# Patient Record
Sex: MALE | Employment: FULL TIME | URBAN - METROPOLITAN AREA
[De-identification: names, ages, dates, MRNs, and addresses within clinical notes are randomized per-mention and may not be internally consistent; named-entity substitution may affect disease eponyms.]

---

## 2017-04-25 ENCOUNTER — TELEPHONE (OUTPATIENT)
Dept: ORTHOPEDICS | Facility: CLINIC | Age: 32
End: 2017-04-25

## 2017-04-25 NOTE — TELEPHONE ENCOUNTER
Elsi paperwork received/completed and placed in outbox.    Igor Sarabia DO, CAMILTONM  [facility]

## 2019-01-09 ENCOUNTER — OFFICE VISIT (OUTPATIENT)
Dept: PEDIATRICS | Facility: CLINIC | Age: 34
End: 2019-01-09
Payer: COMMERCIAL

## 2019-01-09 VITALS
RESPIRATION RATE: 12 BRPM | SYSTOLIC BLOOD PRESSURE: 146 MMHG | HEART RATE: 61 BPM | HEIGHT: 65 IN | BODY MASS INDEX: 30.49 KG/M2 | WEIGHT: 183 LBS | DIASTOLIC BLOOD PRESSURE: 88 MMHG

## 2019-01-09 DIAGNOSIS — R20.2 HEMIPARESTHESIA: ICD-10-CM

## 2019-01-09 DIAGNOSIS — R03.0 ELEVATED BLOOD PRESSURE READING WITHOUT DIAGNOSIS OF HYPERTENSION: ICD-10-CM

## 2019-01-09 DIAGNOSIS — Z00.00 ROUTINE GENERAL MEDICAL EXAMINATION AT A HEALTH CARE FACILITY: Primary | ICD-10-CM

## 2019-01-09 PROCEDURE — 99213 OFFICE O/P EST LOW 20 MIN: CPT | Mod: 25 | Performed by: PEDIATRICS

## 2019-01-09 PROCEDURE — 99385 PREV VISIT NEW AGE 18-39: CPT | Performed by: PEDIATRICS

## 2019-01-09 ASSESSMENT — ENCOUNTER SYMPTOMS
ARTHRALGIAS: 1
HEMATURIA: 0
DYSURIA: 0
PALPITATIONS: 0
NAUSEA: 0
PARESTHESIAS: 0
WEAKNESS: 0
HEARTBURN: 0
ABDOMINAL PAIN: 0
CHILLS: 0
JOINT SWELLING: 0
COUGH: 0
SORE THROAT: 0
CONSTIPATION: 0
SHORTNESS OF BREATH: 0
NERVOUS/ANXIOUS: 0
FREQUENCY: 1
DIARRHEA: 0
EYE PAIN: 0
FEVER: 0
DIZZINESS: 0
MYALGIAS: 1

## 2019-01-09 ASSESSMENT — MIFFLIN-ST. JEOR: SCORE: 1696.96

## 2019-01-09 NOTE — PATIENT INSTRUCTIONS
Thank you for coming in to clinic today. It was a pleasure to meet you. I am currently building my patient panel and would be happy to see you back in clinic. I currently see patients on Wednesday afternoons and Thursday mornings.     Mayra Riley MD  Internal Medicine-Pediatrics   For appointments: [phone]      Doctor's Instructions:   I will call with further instructions / next steps in the next day after I do some more investigation.      Preventive Health Recommendations  Male Ages 26 - 39    Yearly exam:             See your health care provider every year in order to  o   Review health changes.   o   Discuss preventive care.    o   Review your medicines if your doctor has prescribed any.    You should be tested each year for STDs (sexually transmitted diseases), if you re at risk.     After age 35, talk to your provider about cholesterol testing. If you are at risk for heart disease, have your cholesterol tested at least every 5 years.     If you are at risk for diabetes, you should have a diabetes test (fasting glucose).  Shots: Get a flu shot each year. Get a tetanus shot every 10 years.     Nutrition:    Eat at least 5 servings of fruits and vegetables daily.     Eat whole-grain bread, whole-wheat pasta and brown rice instead of white grains and rice.     Get adequate Calcium and Vitamin D.     Lifestyle    Exercise for at least 150 minutes a week (30 minutes a day, 5 days a week). This will help you control your weight and prevent disease.     Limit alcohol to one drink per day.     No smoking.     Wear sunscreen to prevent skin cancer.     See your dentist every six months for an exam and cleaning.

## 2019-01-09 NOTE — PROGRESS NOTES
SUBJECTIVE:   CC: Elmo Hudson is an 33 year old male who presents for preventative health visit.     Discussed health maintenance - declines vaccinations at this time.  Has been noticing that the whole right side of body has been sore for about the last 2-3 months - unsure what is causing it. Feels like it is a bone pain not a joint or muscle ache.   Has been having random nose bleeds out of right nostril for approximately the last 2-3 months. Feels like they are becoming more frequent.    Elmo Hudson is an otherwise healthy 32 yo male presenting to establish care and discuss concerns about right-sided pain.    The patient reports that the whole right side of his body from his neck down has had intermittent throbbing pain as well as numbness and tingling.  He describes the pain as starting in his neck extending to his whole arm the right side of his chest wall and abdomen and groin and right leg.  His symptoms started insidiously and were not preceded by any trauma as far as he can remember.  The pain has been constant since onset but does wax and wane in intensity from 1 to close to 10 out of 10.  He describes the pain as throbbing as well as crushing.  It is throughout his extremities not just localized to the joints.  He denies any weight loss, fevers, chills.  He has occasional headaches which she describes as maybe once per month.  He denies any vision changes.  At times the pain will wake him from sleep.  He has been taking Aleve which does help his pain.  He denies any previous neck injuries, but did have a head injury when he was 2 or 3 years old.  He has no sequelae from that.  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  He has not had any limitations of his job, though this does exacerbate his symptoms at time.  The pain is worse with activity.  In addition to this he also reports nosebleeds occasionally.  These occur 1-2 times per week.  They seem more frequent than  previous wilson.  They do resolve on their own without intervention.  He denies any falls or dropping of objects, but does note he has had to reduce the amount he is able to bench press.  He is not having any difficulty with squatting at work.  He denies any history of numbness, tingling or weakness in the past with the exception of a known work injury to his wrist a couple of years ago.  Those symptoms have completely resolved.  No personal or family history of autoimmune disease including multiple sclerosis. He denies any symptoms in his face, difficulty with speaking. No falls or difficulty with walking, though he does experience pain in his right sole with walking.    He does reports urinary frequency and urgency related to increased oral intake. This has been ongoing for years and does not correlate with his current symptoms.       Physical   Annual:     Getting at least 3 servings of Calcium per day:  Yes    Bi-annual eye exam:  Yes    Dental care twice a year:  Yes    Sleep apnea or symptoms of sleep apnea:  None    Diet:  Regular (no restrictions)    Frequency of exercise:  2-3 days/week    Duration of exercise:  45-60 minutes    Taking medications regularly:  Yes    Medication side effects:  Not applicable    Additional concerns today:  No    PHQ-2 Total Score: 0        Today's PHQ-2 Score:   PHQ-2 ( 1999 Pfizer) 1/9/2019   Q1: Little interest or pleasure in doing things 0   Q2: Feeling down, depressed or hopeless 0   PHQ-2 Score 0   Q1: Little interest or pleasure in doing things Not at all   Q2: Feeling down, depressed or hopeless Not at all   PHQ-2 Score 0       Abuse: Current or Past(Physical, Sexual or Emotional)- No  Do you feel safe in your environment? Yes    Social History     Tobacco Use     Smoking status: Former Smoker     Last attempt to quit: 1/29/2008     Years since quitting: 10.9     Smokeless tobacco: Never Used   Substance Use Topics     Alcohol use: Not on file     Alcohol Use 1/9/2019    If you drink alcohol do you typically have greater than 3 drinks per day OR greater than 7 drinks per week? No   No flowsheet data found.    Last PSA: No results found for: PSA    Reviewed orders with patient. Reviewed health maintenance and updated orders accordingly - Yes  BP Readings from Last 3 Encounters:   01/09/19 146/88   06/01/16 128/82   04/20/16 122/80    Wt Readings from Last 3 Encounters:   01/09/19 83 kg (183 lb)   06/01/16 83.9 kg (185 lb)   04/20/16 83.9 kg (185 lb)            Patient Active Problem List   Diagnosis     Pain of right upper extremity     History reviewed. No pertinent surgical history.   No previous surgery   Social History     Tobacco Use     Smoking status: Former Smoker     Last attempt to quit: 1/29/2008     Years since quitting: 10.9     Smokeless tobacco: Never Used   Substance Use Topics     Alcohol use: Not on file      2 kids 11 yo and 7 yo  Lives with wife and kids.   Social drinking on the weekends. History reviewed. No pertinent family history.      No current outpatient medications on file.       Reviewed and updated as needed this visit by clinical staff  Tobacco  Allergies  Meds  Med Hx  Surg Hx  Fam Hx  Soc Hx          Review of Systems   Constitutional: Negative for chills and fever.   HENT: Negative for ear pain, hearing loss and sore throat.    Eyes: Negative for pain and visual disturbance.   Respiratory: Negative for cough and shortness of breath.    Cardiovascular: Negative for palpitations and peripheral edema.   Gastrointestinal: Negative for abdominal pain, constipation, diarrhea, heartburn and nausea.   Genitourinary: Positive for frequency and urgency. Negative for discharge, dysuria, genital sores, hematuria and impotence.   Musculoskeletal: Positive for arthralgias and myalgias. Negative for joint swelling.   Skin: Negative for rash.   Neurological: Negative for dizziness, weakness and paresthesias.   Psychiatric/Behavioral: Negative for mood  "changes. The patient is not nervous/anxious.          OBJECTIVE:   /88 (BP Location: Right arm, Patient Position: Sitting, Cuff Size: Adult Regular)   Pulse 61   Resp 12   Ht 1.651 m (5' 5\")   Wt 83 kg (183 lb)   BMI 30.45 kg/m      Physical Exam  Exam:  General: the patient is pleasant, resting comfortably, no acute distress.   HEENT: Normocephalic, atraumatic. TMs and canals are normal. No nasal discharge or deformity, no evidence of recent bleed. MMM, no oral lesions, age appropriate dentition. No scleral icterus or conjunctivitis, PERRL.   Neck: supple, full range of motion, no masses  Lymph: no cervical lymphadenopathy  Lungs: Clear to auscultation, no wheezes or crackles.   CV: RRR, nl s1 and s2, no m/r/g.   Abdomen: Soft, nondistended, nontender. No rebound or guarding. Bowel sounds active.  Extremities: No lower extremity edema. Peripheral pulses strong and symmetric.   Skin: no appreciable skin lesions/rashes on exposed skin.   Neuro: Alert and oriented x4, grossly normal neurologic exam. Symmetric muscle tone. CN II-XII intact. Strength 5/5 and symmetric. No appreviable weakness. Grossly intact sensation bilaterally. Finger-nose-finger and heel-shin testing normal. Rapidly alternating movements.     Diagnostic Test Results:  none     ASSESSMENT/PLAN:   1. Routine general medical examination at a health care facility  Otherwise healthy 34 yo male, BMI 30, blood pressure slightly elevated. Will return for labs as had to run off to work. Will check CBC, Lipids and BMP for his glucose.     - CBC with platelets; Future    - Lipid panel reflex to direct LDL Fasting; Future    - Basic metabolic panel  (Ca, Cl, CO2, Creat, Gluc, K, Na, BUN); Future    2. Hemiparesthesia  34 yo male with inisidious onset hemiparesthesias and pain involving upper and lower extremity, but not face. No appreciable weakness on exam, but ability to lift weights reduced on the right by report.  Etiology of his symptoms is " "unclear at this point.  I wonder about the possibility of upper cervical cord lesion such as demyelination.  He has no facial symptoms and large involvement of his body so a motor or muscular region seems unlikely.  The unilateral symptoms raise the potential of a Brownson-Sequard process but he has no trauma to support this. I discussed his case with neurologist on call at [facility] neurology who recommended he be seen and evaluated prior to imaging as he may benefit further from EMG first as this could be a myofascial pain syndrome. There are no red flag signs or symptoms to suggest urgency of workup. Urinary symptoms long proceeded his symptoms and symptoms have been stable over the last 2-3 months. Exam today unremarkable.    - NEUROLOGY ADULT REFERRAL    - Decision for imaging vs EMG per neurology    3. Elevated blood pressure reading without diagnosis of hypertension  Blood pressure elevated today.    - Recheck when he returns for labs in the next week      COUNSELING:   Reviewed preventive health counseling, as reflected in patient instructions       Regular exercise       Healthy diet/nutrition    BP Readings from Last 1 Encounters:   01/09/19 146/88     Estimated body mass index is 30.45 kg/m  as calculated from the following:    Height as of this encounter: 1.651 m (5' 5\").    Weight as of this encounter: 83 kg (183 lb).    BP Screening:   Last 3 BP Readings:    BP Readings from Last 3 Encounters:   01/09/19 146/88   06/01/16 128/82   04/20/16 122/80       The following was recommended to the patient:  Re-screen within 4 weeks and recommend lifestyle modifications  Weight management plan: will need to be readdressed at next visit     reports that he quit smoking about 10 years ago. he has never used smokeless tobacco.      Counseling Resources:  ATP IV Guidelines  Pooled Cohorts Equation Calculator  FRAX Risk Assessment  ICSI Preventive Guidelines  Dietary Guidelines for Americans, 2010  USDA's " MyPlate  ASA Prophylaxis  Lung CA Screening    Mayra Riley MD  [facility]

## 2019-02-14 DIAGNOSIS — D72.819: Primary | ICD-10-CM

## 2019-02-14 DIAGNOSIS — D72.819 LEUKOPENIA, UNSPECIFIED TYPE: Primary | ICD-10-CM

## 2019-02-14 DIAGNOSIS — Z00.00 ROUTINE GENERAL MEDICAL EXAMINATION AT A HEALTH CARE FACILITY: ICD-10-CM

## 2019-02-14 LAB
ERYTHROCYTE [DISTWIDTH] IN BLOOD BY AUTOMATED COUNT: 11.9 % (ref 10–15)
HCT VFR BLD AUTO: 41.8 % (ref 40–53)
HGB BLD-MCNC: 14.1 G/DL (ref 13.3–17.7)
MCH RBC QN AUTO: 28.3 PG (ref 26.5–33)
MCHC RBC AUTO-ENTMCNC: 33.7 G/DL (ref 31.5–36.5)
MCV RBC AUTO: 84 FL (ref 78–100)
PLATELET # BLD AUTO: 218 10E9/L (ref 150–450)
RBC # BLD AUTO: 4.98 10E12/L (ref 4.4–5.9)
WBC # BLD AUTO: 3.3 10E9/L (ref 4–11)

## 2019-02-14 PROCEDURE — 85027 COMPLETE CBC AUTOMATED: CPT | Performed by: PEDIATRICS

## 2019-02-14 PROCEDURE — 80048 BASIC METABOLIC PNL TOTAL CA: CPT | Performed by: PEDIATRICS

## 2019-02-14 PROCEDURE — 85025 COMPLETE CBC W/AUTO DIFF WBC: CPT | Performed by: PEDIATRICS

## 2019-02-14 PROCEDURE — 36415 COLL VENOUS BLD VENIPUNCTURE: CPT | Performed by: PEDIATRICS

## 2019-02-14 PROCEDURE — 80061 LIPID PANEL: CPT | Performed by: PEDIATRICS

## 2019-02-15 ENCOUNTER — TELEPHONE (OUTPATIENT)
Dept: PEDIATRICS | Facility: CLINIC | Age: 34
End: 2019-02-15

## 2019-02-15 LAB
ANION GAP SERPL CALCULATED.3IONS-SCNC: 4 MMOL/L (ref 3–14)
BASOPHILS # BLD AUTO: 0 10E9/L (ref 0–0.2)
BASOPHILS NFR BLD AUTO: 0.3 %
BUN SERPL-MCNC: 18 MG/DL (ref 7–30)
CALCIUM SERPL-MCNC: 9.1 MG/DL (ref 8.5–10.1)
CHLORIDE SERPL-SCNC: 102 MMOL/L (ref 94–109)
CHOLEST SERPL-MCNC: 179 MG/DL
CO2 SERPL-SCNC: 30 MMOL/L (ref 20–32)
CREAT SERPL-MCNC: 1.05 MG/DL (ref 0.66–1.25)
DIFFERENTIAL METHOD BLD: ABNORMAL
EOSINOPHIL # BLD AUTO: 0.1 10E9/L (ref 0–0.7)
EOSINOPHIL NFR BLD AUTO: 3.1 %
ERYTHROCYTE [DISTWIDTH] IN BLOOD BY AUTOMATED COUNT: 11.9 % (ref 10–15)
GFR SERPL CREATININE-BSD FRML MDRD: >90 ML/MIN/{1.73_M2}
GLUCOSE SERPL-MCNC: 84 MG/DL (ref 70–99)
HCT VFR BLD AUTO: 41.8 % (ref 40–53)
HDLC SERPL-MCNC: 76 MG/DL
HGB BLD-MCNC: 14.1 G/DL (ref 13.3–17.7)
LDLC SERPL CALC-MCNC: 96 MG/DL
LYMPHOCYTES # BLD AUTO: 1.7 10E9/L (ref 0.8–5.3)
LYMPHOCYTES NFR BLD AUTO: 50.8 %
MCH RBC QN AUTO: 28.3 PG (ref 26.5–33)
MCHC RBC AUTO-ENTMCNC: 33.7 G/DL (ref 31.5–36.5)
MCV RBC AUTO: 84 FL (ref 78–100)
MONOCYTES # BLD AUTO: 0.5 10E9/L (ref 0–1.3)
MONOCYTES NFR BLD AUTO: 14.4 %
NEUTROPHILS # BLD AUTO: 1 10E9/L (ref 1.6–8.3)
NEUTROPHILS NFR BLD AUTO: 31.4 %
NONHDLC SERPL-MCNC: 103 MG/DL
PLATELET # BLD AUTO: 218 10E9/L (ref 150–450)
POTASSIUM SERPL-SCNC: 3.8 MMOL/L (ref 3.4–5.3)
RBC # BLD AUTO: 4.98 10E12/L (ref 4.4–5.9)
SODIUM SERPL-SCNC: 136 MMOL/L (ref 133–144)
TRIGL SERPL-MCNC: 36 MG/DL
WBC # BLD AUTO: 3.3 10E9/L (ref 4–11)

## 2019-02-15 NOTE — TELEPHONE ENCOUNTER
Reason for Call:  Request for results:    Name of test or procedure: Lab work     Date of test of procedure: 2/14/19    Location of the test or procedure: [facility]     OK to leave the result message on voice mail or with a family member? YES    Phone number Patient can be reached at:  Home number on file [phone] (home)    Additional comments: The pt would like to know if there are lab results that he should be worried about.     The pt is aware that due to the time of day that this call was placed the pt may very well hear back from a nurse tomorrow morning. The pt agrees and understands with this plan.      Call taken on 2/15/2019 at 4:54 PM by Ellen Matta

## 2019-02-18 NOTE — TELEPHONE ENCOUNTER
Called pt this am & LM(see the lab result notes). Awaiting pt's call back. Will close this encounter for now.    Reji, RN  Triage Nurse

## 2019-02-28 DIAGNOSIS — D72.819: ICD-10-CM

## 2019-02-28 DIAGNOSIS — D70.9 NEUTROPENIA, UNSPECIFIED TYPE (H): Primary | ICD-10-CM

## 2019-02-28 LAB
BASOPHILS # BLD AUTO: 0 10E9/L (ref 0–0.2)
BASOPHILS NFR BLD AUTO: 0.3 %
DIFFERENTIAL METHOD BLD: ABNORMAL
EOSINOPHIL # BLD AUTO: 0.1 10E9/L (ref 0–0.7)
EOSINOPHIL NFR BLD AUTO: 3.5 %
ERYTHROCYTE [DISTWIDTH] IN BLOOD BY AUTOMATED COUNT: 12 % (ref 10–15)
HCT VFR BLD AUTO: 39.9 % (ref 40–53)
HGB BLD-MCNC: 13.5 G/DL (ref 13.3–17.7)
LYMPHOCYTES # BLD AUTO: 1.4 10E9/L (ref 0.8–5.3)
LYMPHOCYTES NFR BLD AUTO: 44.1 %
MCH RBC QN AUTO: 28.4 PG (ref 26.5–33)
MCHC RBC AUTO-ENTMCNC: 33.8 G/DL (ref 31.5–36.5)
MCV RBC AUTO: 84 FL (ref 78–100)
MONOCYTES # BLD AUTO: 0.4 10E9/L (ref 0–1.3)
MONOCYTES NFR BLD AUTO: 12.5 %
NEUTROPHILS # BLD AUTO: 1.2 10E9/L (ref 1.6–8.3)
NEUTROPHILS NFR BLD AUTO: 39.6 %
PLATELET # BLD AUTO: 201 10E9/L (ref 150–450)
RBC # BLD AUTO: 4.76 10E12/L (ref 4.4–5.9)
WBC # BLD AUTO: 3.1 10E9/L (ref 4–11)

## 2019-02-28 PROCEDURE — 85025 COMPLETE CBC W/AUTO DIFF WBC: CPT | Performed by: PEDIATRICS

## 2019-02-28 PROCEDURE — 36415 COLL VENOUS BLD VENIPUNCTURE: CPT | Performed by: PEDIATRICS

## 2019-04-04 ENCOUNTER — OFFICE VISIT (OUTPATIENT)
Dept: PEDIATRICS | Facility: CLINIC | Age: 34
End: 2019-04-04
Payer: COMMERCIAL

## 2019-04-04 VITALS
HEART RATE: 79 BPM | OXYGEN SATURATION: 100 % | DIASTOLIC BLOOD PRESSURE: 84 MMHG | TEMPERATURE: 97.7 F | SYSTOLIC BLOOD PRESSURE: 136 MMHG

## 2019-04-04 DIAGNOSIS — R03.0 ELEVATED BLOOD PRESSURE READING WITHOUT DIAGNOSIS OF HYPERTENSION: Primary | ICD-10-CM

## 2019-04-04 DIAGNOSIS — R20.2 PARESTHESIAS: ICD-10-CM

## 2019-04-04 DIAGNOSIS — D70.9 NEUTROPENIA, UNSPECIFIED TYPE (H): ICD-10-CM

## 2019-04-04 PROCEDURE — 99214 OFFICE O/P EST MOD 30 MIN: CPT | Performed by: PEDIATRICS

## 2019-04-04 PROCEDURE — 93000 ELECTROCARDIOGRAM COMPLETE: CPT | Performed by: PEDIATRICS

## 2019-04-04 NOTE — PROGRESS NOTES
SUBJECTIVE:                                                    Elmo Hudson is a 33 year old male who presents to clinic today for the following health issues:      Elmo Hudson is a 34 yo male who presents for follow up exam. I last saw him in clinic 2019 at which point he was complaining of right sided pain. With this I sent a referral to neurology for further workup. Also as part of his general medical exam I obtained a CBC which was significant for neutropenia with ANC of 1, repeat was 1.2.     1. Right sided pain / paresthesias. Continues, intermittently, but overall improved. He did see the neurologist and had an MRI which was unremarkable reportedly. He has a follow up appointment planned.     2. Neutropenia. This dates back to at least  based on previous lab evaluations.     3. Elevated blood pressures. The patient has been told by his dentists that his blood pressure is high. He has been told this now a couple of times. He has never been on meds in the past. No family history of hypertension or heart disease. The patient denies any chest pain, shortness of breath, swelling, exertional symptoms.   BP in : 146/88  BP at dental office: 140s/80s  BP today: 136/84    Problem list and histories reviewed & adjusted, as indicated.  Additional history: as documented    Patient Active Problem List   Diagnosis     Pain of right upper extremity     History reviewed. No pertinent surgical history.    Social History     Tobacco Use     Smoking status: Former Smoker     Last attempt to quit: 2008     Years since quittin.1     Smokeless tobacco: Never Used   Substance Use Topics     Alcohol use: Yes     Alcohol/week: 0.0 oz     History reviewed. No pertinent family history.      No current outpatient medications on file.     No Known Allergies    ROS:  A 10 point ROS is negative other than the symptoms noted above in the HPI.    OBJECTIVE:   /84 (Cuff Size: Adult Regular)   Pulse 79   Temp  97.7  F (36.5  C) (Tympanic)   SpO2 100%     Exam:  General: the patient is pleasant, resting comfortably, no acute distress.   HEENT: Normocephalic, atraumatic. MMM.   Lungs: Clear to auscultation, no wheezes or crackles.   CV: RRR, nl s1 and s2, no m/r/g.   Abdomen: Soft, nondistended, nontender. No rebound or guarding. Bowel sounds active.  Extremities: No lower extremity edema. Peripheral pulses strong and symmetric.   Skin: no appreciable skin lesions/rashes on exposed skin.   Neuro: Alert and oriented x4, grossly normal neurologic exam.     Diagnostic Test Results:  EKG: normal sinus rhythm, normal intervals, normal ST/T wave    ASSESSMENT/PLAN:     1. Elevated blood pressure reading without diagnosis of hypertension  Blood pressure in the pre-hypertensive range today.  Has had elevated pressures in the past.  No signs or symptoms at this point.  Most likely essential hypertension in this 33-year-old male with a BMI of 30.  We discussed options for treatment including lifestyle modifications versus medication.  The patient elected for lifestyle modification.  I agree with this plan as long as we have close follow-up which the patient agrees to.  We will have a weight check and blood pressure check in 3 months. Given occasional palpitations, did obtain EKG which was within normal limits.     - EKG 12-lead complete w/read - Clinics    - Lifestyle changes    2. Neutropenia, unspecified type (H)  ANC has ranged from 1-1.2 in the last 2 checks.  In reviewing his past medical history the patient has had low ANC dating back to 2016.  I think this is most likely neutropenia related to ethnicity.  No signs or symptoms of concerning for infectious etiology.  He has never seen hematology and I think this is an appropriate first step.  I anticipate he will only need one appointment and not need to continue to follow with them.  The patient is agreeable with this plan.    - Follow up with hematology    - Will have CBC,  peripheral smear drawn    3. Paresthesias  Right-sided symptoms which have improved without any intervention.  The patient did have MRI studies through the neurologist which are unavailable to me.  They are reportedly normal.  The plan is for follow-up with neurology.  He denies any falls or weakness.  No progression of symptoms makes sinister pathology this less likely.    - Follow up with neurology as previously planned    - Monitor for worrisome signs and symptoms.       Follow up: 3 months.       Mayra Riley MD  Internal Medicine Pediatrics  [facility]

## 2019-04-04 NOTE — PATIENT INSTRUCTIONS
Thank you for coming in to clinic today. It was a pleasure to meet you. I am currently building my patient panel and would be happy to see you back in clinic. I currently see patients on Wednesday afternoons and Thursday mornings.     Mayra Riley MD  Internal Medicine-Pediatrics   For appointments: [phone]      Doctor's Instructions:   We will draw blood today.    Please call to schedule appointment with blood doctors, Dr. Hamm   Health: Cancer Care/Hematology (All Cancer Related Services) - [place] [phone]   [email or web address]  Dr. Hamm      For your blood pressure:

## 2019-11-07 ENCOUNTER — HEALTH MAINTENANCE LETTER (OUTPATIENT)
Age: 34
End: 2019-11-07

## 2020-11-29 ENCOUNTER — HEALTH MAINTENANCE LETTER (OUTPATIENT)
Age: 35
End: 2020-11-29

## 2021-09-25 ENCOUNTER — HEALTH MAINTENANCE LETTER (OUTPATIENT)
Age: 36
End: 2021-09-25

## 2022-01-15 ENCOUNTER — HEALTH MAINTENANCE LETTER (OUTPATIENT)
Age: 37
End: 2022-01-15

## 2022-05-05 ENCOUNTER — TRANSFERRED RECORDS (OUTPATIENT)
Dept: HEALTH INFORMATION MANAGEMENT | Facility: CLINIC | Age: 37
End: 2022-05-05
Payer: COMMERCIAL

## 2022-12-26 ENCOUNTER — HEALTH MAINTENANCE LETTER (OUTPATIENT)
Age: 37
End: 2022-12-26

## 2023-04-16 ENCOUNTER — HEALTH MAINTENANCE LETTER (OUTPATIENT)
Age: 38
End: 2023-04-16

## 2025-04-10 ENCOUNTER — TRANSFERRED RECORDS (OUTPATIENT)
Dept: HEALTH INFORMATION MANAGEMENT | Facility: CLINIC | Age: 40
End: 2025-04-10
Payer: COMMERCIAL

## 2025-04-25 ENCOUNTER — TRANSFERRED RECORDS (OUTPATIENT)
Dept: HEALTH INFORMATION MANAGEMENT | Facility: CLINIC | Age: 40
End: 2025-04-25
Payer: COMMERCIAL